# Patient Record
Sex: MALE | Race: OTHER | Employment: OTHER | URBAN - METROPOLITAN AREA
[De-identification: names, ages, dates, MRNs, and addresses within clinical notes are randomized per-mention and may not be internally consistent; named-entity substitution may affect disease eponyms.]

---

## 2022-03-02 ENCOUNTER — ESTABLISHED PATIENT (OUTPATIENT)
Dept: URBAN - METROPOLITAN AREA CLINIC 4 | Facility: CLINIC | Age: 67
End: 2022-03-02

## 2022-03-02 DIAGNOSIS — H35.3114: ICD-10-CM

## 2022-03-02 DIAGNOSIS — H35.3121: ICD-10-CM

## 2022-03-02 PROCEDURE — 92250 FUNDUS PHOTOGRAPHY W/I&R: CPT

## 2022-03-02 PROCEDURE — 92014 COMPRE OPH EXAM EST PT 1/>: CPT

## 2022-03-02 PROCEDURE — 92134 CPTRZ OPH DX IMG PST SGM RTA: CPT

## 2022-03-02 ASSESSMENT — VISUAL ACUITY
OS_CC: 20/20
OD_CC: CF 6FT

## 2022-03-02 ASSESSMENT — TONOMETRY
OD_IOP_MMHG: 12
OS_IOP_MMHG: 9

## 2022-12-20 NOTE — PATIENT DISCUSSION
Doing well. Monitor. Previous Labs: No How Did The Hair Loss Occur?: gradual in onset How Severe Is Your Hair Loss?: mild What Hair Products Do You Use?: SHAMPOO FROM [employer]

## 2024-11-15 NOTE — PATIENT DISCUSSION
Continued Stay SW/CM Assessment/Plan of Care Note     Active Substitute Decision Maker (SDM)    There are no active Substitute Decision Maker (SDM) on file.       Progress note:  Plan is for R CFA endarterectomy surgery on Monday 11/18/24. PT/OT rec [facility]. Pt is not agreeable to [facility] or HH. HH PT order was placed and referral sent to [facility] in case pt changes his mind. PT/OT will reeval after surgery. CM will continue to follow for dc needs.     See SW/CM flowsheets for other objective data.    Disposition Recommendations:  Preliminary discharge destination:    SW/CM recommendation for discharge: Home care, Home therapy    Destination Pharmacy:        Discharge Plan/Needs:     Continued Care and Services - Admitted Since 11/8/2024       Home Medical Care       Service Provider Request Status Selected Services Address Phone Fax    [facility] Declined  Patient consent required -- [address] [id number] [phone]                  \  Devices/ Equipment that need to be arranged for discharge:     Accepted   Pending insurance authorization   Others:    Anticipated date of DME availability:    Prior To Hospitalization:    Living Situation: Spouse and residing at House    .  Support Systems: Family members, Significant other   Home Devices/Equipment: None            Mobility Assist Devices: None   Type of Service Prior to Hospitalization: None               Patient/Family discharge goal (s):  Home     Resources provided:           Prior Function  Bed Mobility: Independent (11/09/24 1317 : Emiliana Ribeiro, PT)  Transfers: Independent (11/09/24 1317 : Emiliana Ribeiro, PT)  Ambulation in the Home: Independent (11/09/24 1317 : Emiliana Ribeiro, PT)  Ambulation in the Community: Independent (11/09/24 1317 : Emiliana Ribeiro, PT)    Current Function  Last Filed Values         Value Time User    Current Function  significantly below baseline level of  HX of macular hole, no recurrence. S/PPV 2009 WJM. function 11/11/2024 12:56 PM Manuel Watson, PT    Therapy Impairments  activity tolerance; pain; balance; strength 11/11/2024 12:56 PM Manuel Watson, PT    ADLs Requiring Support  bed mobility; transfers; ambulation; stairs 11/11/2024 12:56 PM Manuel Watson, PT            Therapy Recommendations for Discharge:   PT:      Last Filed Values         Value Time User    PT Discharge Needs  therapy 1-3 times per week 11/11/2024 12:56 PM Manuel aWtson, PT          OT:       Last Filed Values         Value Time User    OT Discharge Needs  therapy 5 or more times per week 11/15/2024  2:43 PM Iram Batista, OTR/L          SLP:    Last Filed Values       None            Mobility Equipment Recommended for Discharge: TBD      Barriers to Discharge  Identified Barriers to Discharge/Transition Planning: